# Patient Record
Sex: FEMALE | Race: WHITE | URBAN - METROPOLITAN AREA
[De-identification: names, ages, dates, MRNs, and addresses within clinical notes are randomized per-mention and may not be internally consistent; named-entity substitution may affect disease eponyms.]

---

## 2023-09-11 ENCOUNTER — NURSE TRIAGE (OUTPATIENT)
Dept: ADMINISTRATIVE | Facility: CLINIC | Age: 33
End: 2023-09-11

## 2023-09-11 NOTE — TELEPHONE ENCOUNTER
Zoila c/o high heart rate between 120 and 150.  Feels jittery and like she is constantly running. Reports current heart rate is 147 while lying down. Advised pt per triage protocol to call  now. V/u.   Reason for Disposition   [1] Dizziness, lightheadedness, or weakness AND [2] heart beating very rapidly (e.g., > 140 / minute)    Additional Information   Negative: Passed out (i.e., lost consciousness, collapsed and was not responding)   Negative: Shock suspected (e.g., cold/pale/clammy skin, too weak to stand, low BP, rapid pulse)   Negative: Difficult to awaken or acting confused (e.g., disoriented, slurred speech)   Negative: Visible sweat on face or sweat dripping down face   Negative: Unable to walk, or can only walk with assistance (e.g., requires support)   Negative: [1] Received SHOCK from implantable cardiac defibrillator AND [2] persisting symptoms (i.e., palpitations, lightheadedness)    Protocols used: Heart Rate and Heartbeat [id number]

## 2024-04-22 NOTE — DISCHARGE INSTRUCTIONS
Patient Education     Discharge Instructions    What care is needed at home?   Take all your medications ordered by your doctor. If a pain medication is ordered, take it for a couple of days. Try taking the pain medication an hour or so before eating. This will make it easier to swallow foods and drink fluids.  Use Tylenol or acetaminophen after the surgery. Medicines - Acetaminophen (sample brand name: Tylenol) or ibuprofen (sample brand names: Advil, Motrin) can help with throat pain.   Blow your nose gently.  Sleep in a room with a humidifier. This will help to keep the nose and throat moist.  Drink fluids often taking small sips of water or fluids. Cool drinks may help soothe a sore throat.  Your jaws may be stiff after the procedure. Apply a warm compress to relieve this.  You may notice white patches at the back of your throat after the surgery. Do not try to remove them.    What follow-up care is needed?   Be sure to keep the follow up visit.    Will physical activity be limited?   Rest for a few days. Reading books, watching TV, or resting are quiet activities. Slowly increase your activity level.  Avoid sports and very active activities at least 2 weeks (like running).   Talk with your doctor about the right amount of activity for you. Ask your doctor when you can go back to school.    What changes to diet are needed?   Soft foods and drinks may be easier for you to eat. Try soups, jello, yogurt, mashed potatoes, scrambled eggs, and popsicles. Foods that are easy to swallow - Such as soft bread, Jell-O, pudding, and applesauce.  Plenty of liquids - This can be water, juice, broth, or an electrolyte solution that you buy in a store or pharmacy (such as Pedialyte or Gatorade). You might not feel like drinking, but it's important to drink enough liquids.  Avoid drinks that have a lot of acid, like orange and grapefruit juice.  Avoid drinking dairy products that make more mucus in the throat.    What problems  could happen?   Infection  Breathing problems  Throwing up blood  Ear pain  Swallowing is difficult    When do I need to call the doctor?   Bleeding that is bright red. Go to the ER if there is a lot of bleeding that comes from the nose or mouth.  Signs of infection. These include a fever of 100.4°F (38°C) or higher, chills.  Redness, swelling, pain, or discharge from the mouth or nose  Swelling or redness of the eyes  Stiffness in the neck  Upset stomach or throwing up  Trouble breathing  Cannot drink liquids  Has a severe sore throat or ear pain that doesn't get better after a week or two or gets worse  You ar not feeling better in 2 to 3 days or is feeling worse    Helpful tips   Wash hands often with soap and water for at least 20 seconds, especially after coughing or sneezing. Alcohol-based hand sanitizers also work to kill germs.  Stay away from sick people.   Keep away from people who smoke.  Do not swim for at least 3 weeks after surgery.

## 2024-04-24 ENCOUNTER — ANESTHESIA (OUTPATIENT)
Dept: SURGERY | Facility: HOSPITAL | Age: 34
End: 2024-04-24
Payer: COMMERCIAL

## 2024-04-24 ENCOUNTER — ANESTHESIA EVENT (OUTPATIENT)
Dept: SURGERY | Facility: HOSPITAL | Age: 34
End: 2024-04-24
Payer: COMMERCIAL

## 2024-04-24 ENCOUNTER — HOSPITAL ENCOUNTER (OUTPATIENT)
Facility: HOSPITAL | Age: 34
Discharge: HOME | End: 2024-04-24
Attending: OTOLARYNGOLOGY | Admitting: OTOLARYNGOLOGY
Payer: COMMERCIAL

## 2024-04-24 DIAGNOSIS — J03.91 RECURRENT ACUTE TONSILLITIS: ICD-10-CM

## 2024-04-24 LAB
B-HCG UR QL: NEGATIVE
CTP QC/QA: YES

## 2024-04-24 PROCEDURE — 71000016 HC POSTOP RECOV ADDL HR: Performed by: OTOLARYNGOLOGY

## 2024-04-24 PROCEDURE — 37000008 HC ANESTHESIA 1ST 15 MINUTES: Performed by: OTOLARYNGOLOGY

## 2024-04-24 PROCEDURE — 71000033 HC RECOVERY, INTIAL HOUR: Performed by: OTOLARYNGOLOGY

## 2024-04-24 PROCEDURE — 36000706: Performed by: OTOLARYNGOLOGY

## 2024-04-24 PROCEDURE — 25000003 PHARM REV CODE 250: Performed by: NURSE ANESTHETIST, CERTIFIED REGISTERED

## 2024-04-24 PROCEDURE — 63600175 PHARM REV CODE 636 W HCPCS: Performed by: NURSE ANESTHETIST, CERTIFIED REGISTERED

## 2024-04-24 PROCEDURE — 37000009 HC ANESTHESIA EA ADD 15 MINS: Performed by: OTOLARYNGOLOGY

## 2024-04-24 PROCEDURE — 63600175 PHARM REV CODE 636 W HCPCS: Performed by: ANESTHESIOLOGY

## 2024-04-24 PROCEDURE — 81025 URINE PREGNANCY TEST: CPT | Performed by: ANESTHESIOLOGY

## 2024-04-24 PROCEDURE — 63600175 PHARM REV CODE 636 W HCPCS: Performed by: OTOLARYNGOLOGY

## 2024-04-24 PROCEDURE — 25000003 PHARM REV CODE 250: Performed by: ANESTHESIOLOGY

## 2024-04-24 PROCEDURE — 71000015 HC POSTOP RECOV 1ST HR: Performed by: OTOLARYNGOLOGY

## 2024-04-24 PROCEDURE — D9220A PRA ANESTHESIA: Mod: CRNA,,, | Performed by: NURSE ANESTHETIST, CERTIFIED REGISTERED

## 2024-04-24 PROCEDURE — D9220A PRA ANESTHESIA: Mod: ANES,,, | Performed by: ANESTHESIOLOGY

## 2024-04-24 PROCEDURE — 36000707: Performed by: OTOLARYNGOLOGY

## 2024-04-24 RX ORDER — DIPHENHYDRAMINE HYDROCHLORIDE 50 MG/ML
25 INJECTION INTRAMUSCULAR; INTRAVENOUS EVERY 6 HOURS PRN
Status: DISCONTINUED | OUTPATIENT
Start: 2024-04-24 | End: 2024-04-24 | Stop reason: HOSPADM

## 2024-04-24 RX ORDER — IPRATROPIUM BROMIDE AND ALBUTEROL SULFATE 2.5; .5 MG/3ML; MG/3ML
3 SOLUTION RESPIRATORY (INHALATION) ONCE AS NEEDED
Status: DISCONTINUED | OUTPATIENT
Start: 2024-04-24 | End: 2024-04-24 | Stop reason: HOSPADM

## 2024-04-24 RX ORDER — AMPICILLIN 500 MG/1
1000 INJECTION, POWDER, FOR SOLUTION INTRAMUSCULAR; INTRAVENOUS
Status: COMPLETED | OUTPATIENT
Start: 2024-04-24 | End: 2024-04-24

## 2024-04-24 RX ORDER — MIDAZOLAM HYDROCHLORIDE 2 MG/2ML
2 INJECTION, SOLUTION INTRAMUSCULAR; INTRAVENOUS ONCE AS NEEDED
Status: COMPLETED | OUTPATIENT
Start: 2024-04-24 | End: 2024-04-24

## 2024-04-24 RX ORDER — SODIUM CHLORIDE, SODIUM LACTATE, POTASSIUM CHLORIDE, CALCIUM CHLORIDE 600; 310; 30; 20 MG/100ML; MG/100ML; MG/100ML; MG/100ML
INJECTION, SOLUTION INTRAVENOUS CONTINUOUS
Status: DISCONTINUED | OUTPATIENT
Start: 2024-04-24 | End: 2024-04-24 | Stop reason: HOSPADM

## 2024-04-24 RX ORDER — SODIUM CHLORIDE 9 MG/ML
INJECTION, SOLUTION INTRAVENOUS CONTINUOUS
Status: DISCONTINUED | OUTPATIENT
Start: 2024-04-24 | End: 2024-04-24 | Stop reason: HOSPADM

## 2024-04-24 RX ORDER — ROCURONIUM BROMIDE 10 MG/ML
INJECTION, SOLUTION INTRAVENOUS
Status: DISCONTINUED | OUTPATIENT
Start: 2024-04-24 | End: 2024-04-24

## 2024-04-24 RX ORDER — HYDROMORPHONE HYDROCHLORIDE 2 MG/ML
0.2 INJECTION, SOLUTION INTRAMUSCULAR; INTRAVENOUS; SUBCUTANEOUS EVERY 5 MIN PRN
Status: DISCONTINUED | OUTPATIENT
Start: 2024-04-24 | End: 2024-04-24 | Stop reason: HOSPADM

## 2024-04-24 RX ORDER — ACETAMINOPHEN 500 MG
1000 TABLET ORAL ONCE
Status: COMPLETED | OUTPATIENT
Start: 2024-04-24 | End: 2024-04-24

## 2024-04-24 RX ORDER — OXYMETAZOLINE HCL 0.05 %
SPRAY, NON-AEROSOL (ML) NASAL
Status: DISCONTINUED
Start: 2024-04-24 | End: 2024-04-24 | Stop reason: WASHOUT

## 2024-04-24 RX ORDER — MEPERIDINE HYDROCHLORIDE 25 MG/ML
12.5 INJECTION INTRAMUSCULAR; INTRAVENOUS; SUBCUTANEOUS ONCE
Status: DISCONTINUED | OUTPATIENT
Start: 2024-04-24 | End: 2024-04-24 | Stop reason: HOSPADM

## 2024-04-24 RX ORDER — LIDOCAINE HYDROCHLORIDE 20 MG/ML
INJECTION INTRAVENOUS
Status: DISCONTINUED | OUTPATIENT
Start: 2024-04-24 | End: 2024-04-24

## 2024-04-24 RX ORDER — METOCLOPRAMIDE HYDROCHLORIDE 5 MG/ML
10 INJECTION INTRAMUSCULAR; INTRAVENOUS EVERY 10 MIN PRN
Status: DISCONTINUED | OUTPATIENT
Start: 2024-04-24 | End: 2024-04-24 | Stop reason: HOSPADM

## 2024-04-24 RX ORDER — FENTANYL CITRATE 50 UG/ML
INJECTION, SOLUTION INTRAMUSCULAR; INTRAVENOUS
Status: DISCONTINUED | OUTPATIENT
Start: 2024-04-24 | End: 2024-04-24

## 2024-04-24 RX ORDER — HYDROCODONE BITARTRATE AND ACETAMINOPHEN 5; 325 MG/1; MG/1
1 TABLET ORAL
Status: DISCONTINUED | OUTPATIENT
Start: 2024-04-24 | End: 2024-04-24 | Stop reason: HOSPADM

## 2024-04-24 RX ORDER — METOCLOPRAMIDE HYDROCHLORIDE 5 MG/ML
10 INJECTION INTRAMUSCULAR; INTRAVENOUS ONCE
Status: COMPLETED | OUTPATIENT
Start: 2024-04-24 | End: 2024-04-24

## 2024-04-24 RX ORDER — FAMOTIDINE 10 MG/ML
20 INJECTION INTRAVENOUS ONCE
Status: COMPLETED | OUTPATIENT
Start: 2024-04-24 | End: 2024-04-24

## 2024-04-24 RX ORDER — ONDANSETRON HYDROCHLORIDE 2 MG/ML
4 INJECTION, SOLUTION INTRAVENOUS ONCE
Status: DISCONTINUED | OUTPATIENT
Start: 2024-04-24 | End: 2024-04-24 | Stop reason: HOSPADM

## 2024-04-24 RX ORDER — DEXAMETHASONE SODIUM PHOSPHATE 4 MG/ML
INJECTION, SOLUTION INTRA-ARTICULAR; INTRALESIONAL; INTRAMUSCULAR; INTRAVENOUS; SOFT TISSUE
Status: DISCONTINUED | OUTPATIENT
Start: 2024-04-24 | End: 2024-04-24

## 2024-04-24 RX ORDER — LIDOCAINE HYDROCHLORIDE 10 MG/ML
1 INJECTION, SOLUTION EPIDURAL; INFILTRATION; INTRACAUDAL; PERINEURAL ONCE
Status: DISCONTINUED | OUTPATIENT
Start: 2024-04-24 | End: 2024-04-24 | Stop reason: HOSPADM

## 2024-04-24 RX ORDER — PROPOFOL 10 MG/ML
VIAL (ML) INTRAVENOUS
Status: DISCONTINUED | OUTPATIENT
Start: 2024-04-24 | End: 2024-04-24

## 2024-04-24 RX ORDER — GLYCOPYRROLATE 0.2 MG/ML
INJECTION INTRAMUSCULAR; INTRAVENOUS
Status: DISCONTINUED | OUTPATIENT
Start: 2024-04-24 | End: 2024-04-24

## 2024-04-24 RX ORDER — METHOCARBAMOL 100 MG/ML
1000 INJECTION, SOLUTION INTRAMUSCULAR; INTRAVENOUS ONCE
Status: COMPLETED | OUTPATIENT
Start: 2024-04-24 | End: 2024-04-24

## 2024-04-24 RX ORDER — ONDANSETRON HYDROCHLORIDE 2 MG/ML
INJECTION, SOLUTION INTRAVENOUS
Status: DISCONTINUED | OUTPATIENT
Start: 2024-04-24 | End: 2024-04-24

## 2024-04-24 RX ORDER — SCOLOPAMINE TRANSDERMAL SYSTEM 1 MG/1
1 PATCH, EXTENDED RELEASE TRANSDERMAL
Status: DISCONTINUED | OUTPATIENT
Start: 2024-04-24 | End: 2024-04-24 | Stop reason: HOSPADM

## 2024-04-24 RX ORDER — HYDROMORPHONE HYDROCHLORIDE 2 MG/ML
0.5 INJECTION, SOLUTION INTRAMUSCULAR; INTRAVENOUS; SUBCUTANEOUS EVERY 5 MIN PRN
Status: DISCONTINUED | OUTPATIENT
Start: 2024-04-24 | End: 2024-04-24 | Stop reason: HOSPADM

## 2024-04-24 RX ADMIN — MIDAZOLAM HYDROCHLORIDE 2 MG: 1 INJECTION, SOLUTION INTRAMUSCULAR; INTRAVENOUS at 10:04

## 2024-04-24 RX ADMIN — METOCLOPRAMIDE 10 MG: 5 INJECTION, SOLUTION INTRAMUSCULAR; INTRAVENOUS at 09:04

## 2024-04-24 RX ADMIN — SODIUM CHLORIDE, POTASSIUM CHLORIDE, SODIUM LACTATE AND CALCIUM CHLORIDE: 600; 310; 30; 20 INJECTION, SOLUTION INTRAVENOUS at 09:04

## 2024-04-24 RX ADMIN — ACETAMINOPHEN 1000 MG: 500 TABLET ORAL at 09:04

## 2024-04-24 RX ADMIN — SUGAMMADEX 100 MG: 100 INJECTION, SOLUTION INTRAVENOUS at 11:04

## 2024-04-24 RX ADMIN — AMPICILLIN SODIUM 1000 MG: 500 INJECTION, POWDER, FOR SOLUTION INTRAMUSCULAR; INTRAVENOUS at 10:04

## 2024-04-24 RX ADMIN — LIDOCAINE HYDROCHLORIDE 50 MG: 20 INJECTION INTRAVENOUS at 10:04

## 2024-04-24 RX ADMIN — ROCURONIUM BROMIDE 50 MG: 50 INJECTION INTRAVENOUS at 10:04

## 2024-04-24 RX ADMIN — FENTANYL CITRATE 100 MCG: 50 INJECTION, SOLUTION INTRAMUSCULAR; INTRAVENOUS at 10:04

## 2024-04-24 RX ADMIN — PROPOFOL 130 MG: 10 INJECTION, EMULSION INTRAVENOUS at 10:04

## 2024-04-24 RX ADMIN — METHOCARBAMOL 1000 MG: 100 INJECTION INTRAMUSCULAR; INTRAVENOUS at 11:04

## 2024-04-24 RX ADMIN — FAMOTIDINE 20 MG: 10 INJECTION, SOLUTION INTRAVENOUS at 09:04

## 2024-04-24 RX ADMIN — ONDANSETRON 4 MG: 2 INJECTION INTRAMUSCULAR; INTRAVENOUS at 10:04

## 2024-04-24 RX ADMIN — DEXAMETHASONE SODIUM PHOSPHATE 8 MG: 4 INJECTION, SOLUTION INTRA-ARTICULAR; INTRALESIONAL; INTRAMUSCULAR; INTRAVENOUS; SOFT TISSUE at 10:04

## 2024-04-24 RX ADMIN — GLYCOPYRROLATE 0.2 MG: 0.2 INJECTION INTRAMUSCULAR; INTRAVENOUS at 10:04

## 2024-04-24 RX ADMIN — SCOPOLAMINE 1 PATCH: 1 PATCH TRANSDERMAL at 09:04

## 2024-04-24 NOTE — ANESTHESIA PREPROCEDURE EVALUATION
2024  Zoila Castellon is a 33 y.o., female presents for tonsillectomy.    Other Medical History  Hypotension  Bradycardia-Pacemaker due to sick sinus syndrome  Kidney donor 6 months ago  Unintentional weight loss    Surgical History    INSERTION OF PACEMAKER  SECTION   TRANSPLANT - KIDNEY - DONOR removal of cyst from ear     Pre-op Assessment    I have reviewed the Patient Summary Reports.     I have reviewed the Nursing Notes. I have reviewed the NPO Status.   I have reviewed the Medications.     Review of Systems  Anesthesia Hx:  No problems with previous Anesthesia                Social:  Smoker           Physical Exam  General: Cooperative, Alert, Oriented and Cachexia    Airway:  Mallampati: III   Mouth Opening: Normal  TM Distance: Normal  Tongue: Normal  Neck ROM: Normal ROM    Dental:  Intact    Chest/Lungs:  Clear to auscultation, Normal Respiratory Rate    Heart:  Rate: Normal  Rhythm: Regular Rhythm  Sounds: Normal    Abdomen:  Normal, Soft, Nontender      Anesthesia Plan  Type of Anesthesia, risks & benefits discussed:    Anesthesia Type: Gen ETT  Intra-op Monitoring Plan: Standard ASA Monitors  Post Op Pain Control Plan: multimodal analgesia  Induction:  IV  Airway Plan: Direct  Informed Consent: Informed consent signed with the Patient and all parties understand the risks and agree with anesthesia plan.  All questions answered.   ASA Score: 3  Day of Surgery Review of History & Physical: H&P Update referred to the surgeon/provider.    Ready For Surgery From Anesthesia Perspective.     .

## 2024-04-24 NOTE — OP NOTE
[facility] [address] [place] [address]    PATIENT NAME:      NUBIA CASTELLANOS   YOB: 1990  CSN:               [id number]  MRN:               [id number]  ADMIT DATE:        04/24/2024 08:18:00  PHYSICIAN:         Petra Nevarez MD                          OPERATIVE REPORT      DATE OF SURGERY:    04/24/2024 00:00:00    SURGEON:  Petra Nevarez MD    NAME OF OPERATION:  Tonsillectomy.    PREOPERATIVE DIAGNOSIS:  Tonsillitis.    POSTOPERATIVE DIAGNOSIS:  Tonsillitis.    ANESTHESIA:  General endotracheal anesthesia.    ESTIMATED BLOOD LOSS:  10 cc.    COMPLICATIONS:  None.    INDICATIONS FOR SURGERY:  The patient is a 33-year-old female with tonsillitis.    PROCEDURE IN DETAIL:  After appropriate consents were obtained, the patient was   taken to the operating room and placed supine on the OR table.  Under general   endotracheal anesthesia, she was prepped and draped in an appropriate fashion.    The Higinio-Roland mouth gag was placed without difficulty.  The patient's hard and   soft palate were palpated and visualized and noted to be normal.  The left   tonsil was grasped with a curved Allis clamp and dissected cleanly from the   tonsil fossa using the Bovie.  The right tonsil was removed in a similar   fashion.  The suction Bovie was used to cauterize several superficial vessels in   the tonsillar fossa.  The patient was irrigated with saline and noted to have   good hemostasis and a good result.  The patient tolerated the procedure well and   was taken in stable condition from the operating room to the recovery room.        ______________________________  Petra Nevarez MD    TEM/AQS  DD:  04/24/2024  Time:  11:03AM  DT:  04/24/2024  Time:  11:37AM  Job #:  [id number]      OPERATIVE REPORT

## 2024-04-24 NOTE — ANESTHESIA PROCEDURE NOTES
Intubation    Date/Time: 4/24/2024 10:46 AM    Performed by: Gaby Guallpa CRNA  Authorized by: Gregg Car DO    Intubation:     Induction:  Intravenous    Intubated:  Postinduction    Mask Ventilation:  Easy mask    Attempts:  1    Attempted By:  CRNA    Method of Intubation:  Direct    Blade:  Quesada 2    Laryngeal View Grade: Grade I - full view of cords      Difficult Airway Encountered?: No      Complications:  None    Airway Device:  Oral william    Airway Device Size:  7.0    Style/Cuff Inflation:  Cuffed (inflated to minimal occlusive pressure)    Inflation Amount (mL):  7    Tube secured:  22    Secured at:  The lips    Placement Verified By:  Capnometry    Complicating Factors:  None    Findings Post-Intubation:  BS equal bilateral and atraumatic/condition of teeth unchanged

## 2024-04-24 NOTE — TRANSFER OF CARE
"Anesthesia Transfer of Care Note    Patient: Zoila Castellon    Procedure(s) Performed: Procedure(s) (LRB):  TONSILLECTOMY (N/A)    Patient location: PACU    Anesthesia Type: general    Transport from OR: Transported from OR on room air with adequate spontaneous ventilation    Post pain: adequate analgesia    Post assessment: no apparent anesthetic complications and tolerated procedure well    Post vital signs: stable    Level of consciousness: responds to stimulation    Nausea/Vomiting: no nausea/vomiting    Complications: none    Transfer of care protocol was followed      Last vitals: Visit Vitals  BP (!) 101/57   Pulse 62   Temp 36.9 °C (98.4 °F) (Oral)   Resp 16   Ht 5' 3" (1.6 m)   Wt 45.3 kg (99 lb 13.9 oz)   LMP 04/15/2024 (Approximate)   SpO2 95%   Breastfeeding No   BMI 17.69 kg/m²     "

## 2024-04-24 NOTE — DISCHARGE SUMMARY
[facility] [address] [place] [address]    PATIENT NAME:       NUBIA CASTELLANOS   YOB: 1990  CSN:                [id number]   MRN:                [id number]  ADMIT DATE:         04/24/2024 08:18:00  PHYSICIAN:          Petra Nevarez MD                          DISCHARGE SUMMARY    DATE OF DISCHARGE:  04/24/2024 00:00:00    Nubia is a very pleasant 33-year-old female who was admitted on April 24th for   tonsillectomy.  The patient had surgery, it went very well.  She went to   Recovery and did well in Recovery.  She was transferred back to her room.    There, she was tolerating a post tonsillectomy diet.  Her pain was under good   control.  She was discharged to home with instructions to follow up in my clinic   next week.  She was given prescriptions for antibiotics and pain medicine.  She   was instructed to follow a post tonsillectomy diet.  She was also instructed to   follow up in the emergency room for any problems.        ______________________________  Petra Nevarez MD    TEM/AQS  DD:  04/24/2024  Time:  11:05AM  DT:  04/24/2024  Time:  11:44AM  Job #:  [id number]      DISCHARGE SUMMARY

## 2024-04-24 NOTE — ANESTHESIA POSTPROCEDURE EVALUATION
Anesthesia Post Evaluation    Patient: Zoila Castellon    Procedure(s) Performed: Procedure(s) (LRB):  TONSILLECTOMY (N/A)    Final Anesthesia Type: general      Patient location during evaluation: PACU  Patient participation: Yes- Able to Participate  Level of consciousness: awake and alert  Post-procedure vital signs: reviewed and stable  Pain management: adequate  Airway patency: patent  CARLEY mitigation strategies: Multimodal analgesia  PONV status at discharge: No PONV  Anesthetic complications: no      Cardiovascular status: hemodynamically stable  Respiratory status: unassisted  Hydration status: euvolemic  Follow-up not needed.              Vitals Value Taken Time   /64 04/24/24 1140   Temp 36.2 °C (97.2 °F) 04/24/24 1114   Pulse 77 04/24/24 1148   Resp 18 04/24/24 1140   SpO2 95 % 04/24/24 1148   Vitals shown include unfiled device data.      No case tracking events are documented in the log.      Pain/Gato Score: Pain Rating Prior to Med Admin: 0 (4/24/2024  9:14 AM)  Gato Score: 8 (4/24/2024 11:28 AM)

## 2024-04-24 NOTE — PLAN OF CARE
Patient resting comfortably, VSS, no signs or symptoms of distress.  Gato at acceptable level for transfer to phase II.  Criteria met for anesthesia, released per Dr. Car.

## 2024-04-25 VITALS
DIASTOLIC BLOOD PRESSURE: 66 MMHG | HEIGHT: 63 IN | SYSTOLIC BLOOD PRESSURE: 101 MMHG | HEART RATE: 70 BPM | OXYGEN SATURATION: 93 % | TEMPERATURE: 98 F | RESPIRATION RATE: 16 BRPM | BODY MASS INDEX: 17.7 KG/M2 | WEIGHT: 99.88 LBS

## 2024-04-25 LAB — PSYCHE PATHOLOGY RESULT: NORMAL

## 2024-05-08 NOTE — DISCHARGE SUMMARY
[facility] [address] [place] [address]    PATIENT NAME:       NUBIA CASTELLON   YOB: 1990  CSN:                [id number]   MRN:                [id number]  ADMIT DATE:         04/24/2024 08:18:00  PHYSICIAN:          Petra Nevarez MD                          DISCHARGE SUMMARY    DATE OF DISCHARGE:  04/24/2024 13:03:00    Nubia Castellon is a very pleasant young lady, who was admitted for   tonsillectomy.  The patient had her tonsillectomy.  She did very well in the   recovery room.  Her vitals were stable.  She was transferred to her room.  She   did very well in her room.  She was tolerating a regular diet and her pain was   under good control.  She was discharged to home on a posttonsillectomy diet.    She has a followup appointment with me next week in my office.  She was   instructed to go to the emergency room for any problems.        ______________________________  Petra Nevarez MD    TEM/AQS  DD:  05/08/2024  Time:  06:51AM  DT:  05/08/2024  Time:  07:58AM  Job #:  [id number]      DISCHARGE SUMMARY

## (undated) DEVICE — GLOVE SENSICARE PI MICRO 7

## (undated) DEVICE — PIN SAFETY STERILE 2 MEDIUM

## (undated) DEVICE — SPONGE GAUZE 16PLY 4X4

## (undated) DEVICE — SYR 3ML LL 18GA 1.5IN

## (undated) DEVICE — TOWEL OR DISP STRL BLUE 4/PK

## (undated) DEVICE — ELECTRODE BLADE INSULATED 1 IN

## (undated) DEVICE — ELECTRODE PATIENT RETURN DISP

## (undated) DEVICE — SUPPORT ULNA NERVE PROTECTOR

## (undated) DEVICE — KIT ANTIFOG W/SPONG & FLUID

## (undated) DEVICE — PENCIL ELECSURG ROCKER 15FT

## (undated) DEVICE — Device

## (undated) DEVICE — SOL NACL IRR 1000ML BTL